# Patient Record
Sex: FEMALE | Race: WHITE | NOT HISPANIC OR LATINO | Employment: STUDENT | URBAN - METROPOLITAN AREA
[De-identification: names, ages, dates, MRNs, and addresses within clinical notes are randomized per-mention and may not be internally consistent; named-entity substitution may affect disease eponyms.]

---
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
## 2024-05-23 ENCOUNTER — PATIENT MESSAGE (OUTPATIENT)
Dept: NEUROLOGY | Facility: CLINIC | Age: 19
End: 2024-05-23
Payer: MEDICAID

## 2024-05-27 NOTE — PROGRESS NOTES
"[facility] Neurology Follow Up Office Visit Note    Initial Visit Date: 1/4/2023  Last Visit Date: 5/3/2023  Current Visit Date:  05/28/2024    Chief Complaint:     Chief Complaint   Patient presents with    Migraine     States 10-12 migraine/headaches per month; Nurtec every other day not effective; Not taking Rizatriptan; has been on Qulipta for a couple of days       History of Present Illness:      This is 19 y.o. female with history of anxiety, depression, who is referred for bruxism, chronic migraine without aura. During last visit, Rimegepant 75 ODT once every other day was started. Patient had been lost to follow up since 5/2023. Now complaining of left visual field permanent blurriness and distortion since 1/2024.      Age of Onset : 6 years old     Headache Description:   Bi-occipital, "annoying", lasting 4-5 days, severe, impeding day to day activity, with nausea, with photophobia and phonophobia. + left field vision loss.   2. Bitemporal, vortical, dull, moderate, not impeding day to day activity, without nausea, without photophobia and phonophobia. Would wake up with headache.      Frequency: 16-20 headache days per month 8-12 migraine headache days per month.      Provocation Factors: loud noises. Stress. Has started on Depo-Provera in 12/2023.      Risk Factors  - Family history of headache disorder: Yes maternal grandmother with headache disorder.  - History of focal CNS lesions: No  - History of CNS infections: No  - History head trauma: No  - History of underlying mood disorder: Yes anxiety, depression. Uncontrolled.  - History of sleep disorder: Yes Bruxism not on mouth guard.   - Recreational drug use: No  - Tobacco use: No  - Alcohol use: No  - Weight fluctuation: Not Applicable  - Isotretinoin or Tetracycline use:  No  - Family planning and contraceptive use: Yes on birth control. Switched over to Depo-Provera since 1/2024.    Medications:     Current Prophylactic  Atogepant 30 mg daily (5/23/2024 to " present): started by PCP     Current Abortive  Rizatriptan 10 mg twice a day as needed (1/4/2023 to present): no taking     Prior Prophylactic  Topiramate 150 mg once a day (8/1/2019 - 4/1/2022):  Ineffective  Effexor 37.5 mg daily (1/4/2023 to 2/1/2023): states severe depression.   Tizanidine 2 mg at bedtime (1/4/2023 to 2/1/2023)  Galcanezumab 120 mg once per month  (11/2022): only had one dose.   Sertraline 100 mg daily   Rimegepant 75 mg once every other day (5/3/2023 to 7/2023): no longer taking.      Prior Abortive  Sumatriptan 25 mg twice a day as needed (2/7/2022 - 3/25/2022):  Ineffective  Fioricet PRN (2019)        Devices:     - VNS:  - TNS  - TMS:     Procedures:     - Botox:  - PSG block:   - Occipital nerve block:     Labs:     No results found for this or any previous visit.    Studies:     - MRI Brain +/- Xavi [facility] 4/[facility]24 [facility]: As per report, unremarkable.   - MRI Brain without contrast 9/13/2021:  I have reviewed the study independently and with the patient. Unremarkable.  - MRA Head w/o Xavi:   - MRV Head w/o Xavi:   - NCHCT:  - Lumbar Puncture:    Review of Systems:     Review of Systems   All other systems reviewed and are negative.      Physical Exams:     Vitals:    05/28/24 0939   BP: 109/76   Pulse: 107   Resp: 14   Temp: 98.4 °F (36.9 °C)         Physical Exam  Vitals and nursing note reviewed.   Constitutional:       Appearance: Normal appearance.   HENT:      Head: Normocephalic and atraumatic.      Nose: Nose normal.      Mouth/Throat:      Mouth: Mucous membranes are moist.      Pharynx: Oropharynx is clear.   Eyes:      Conjunctiva/sclera: Conjunctivae normal.   Cardiovascular:      Rate and Rhythm: Normal rate and regular rhythm.      Pulses: Normal pulses.   Pulmonary:      Effort: Pulmonary effort is normal.      Breath sounds: Normal breath sounds.   Abdominal:      General: Abdomen is flat.   Musculoskeletal:         General: Normal range of motion.      Cervical back:  Normal range of motion.   Skin:     General: Skin is warm.   Neurological:      Mental Status: She is alert.         Comprehensive Neurological Exam:  Mental Status: Alert Oriented to Self, Date, and Place. Comprehension wnl. No dysarthria.   CN II - XII: HAMILTON, No APD, Fundus wnl OU, VF left homonymous hemianopsia, No ptosis OU, EOMI without nystagmus LT/Temp symmetric in CN V1-3 distribution, Hearing grossly intact, Face Symmetric, Tongue and Uvula midline, Trapezius symmetric bilateral.   Motor: tone and bulk wnl throughout, no abnormal involuntary or voluntary movements, 5/5 confrontation, Fine finger movements wnl b/l, No pronator drift.   Sensory: LT, Proprioception, Vibration, PP, Temp symmetric.  Reflexes: 2+ throughout, plantar reflexes downward bilateral.   Cerebellar: FNF wnl b/l, RAHM wnl b/l  Romberg: Negative  Gait: normal. Heel Gait, Toe Gait, Tandem Gait wnl.     Assessment:     This is 19 y.o. female with history of  anxiety, depression, who is referred for bruxism and chronic migraine with occipital aura now with visual snow, worsening by hormone based OCP. Medication overuse headache has resolved. Patient has failed multiple prophylactic medications.     Problem List Items Addressed This Visit          Neuro    Chronic migraine with aura without status migrainosus, not intractable - Primary    Relevant Medications    DEPO-PROVERA 150 mg/mL injection    atogepant 60 mg Tab    lamoTRIgine (LAMICTAL) 25 MG tablet    lamoTRIgine 50 mg TbDL (Start on 6/25/2024)    cyclobenzaprine (FLEXERIL) 5 MG tablet    DULoxetine (CYMBALTA) 30 MG capsule (Start on 6/27/2024)    rizatriptan (MAXALT) 10 MG tablet    Other Relevant Orders    Ambulatory referral/consult to Gynecology       Psychiatric    Anxiety (Chronic)    Relevant Medications    rizatriptan (MAXALT) 10 MG tablet       ENT    Bruxism (Chronic)    Relevant Medications    rizatriptan (MAXALT) 10 MG tablet     Other Visit Diagnoses       Visual snow  syndrome        Relevant Medications    DEPO-PROVERA 150 mg/mL injection    atogepant 60 mg Tab    lamoTRIgine (LAMICTAL) 25 MG tablet    lamoTRIgine 50 mg TbDL (Start on 6/25/2024)    cyclobenzaprine (FLEXERIL) 5 MG tablet    DULoxetine (CYMBALTA) 30 MG capsule (Start on 6/27/2024)    rizatriptan (MAXALT) 10 MG tablet    Other Relevant Orders    Ambulatory referral/consult to Gynecology              Plan:     [] increase Atogepant to 60 mg daily   [] start Lamotrigine 25 mg daily for 14 days then 50 mg daily thereafter  [] continue with Rizatriptan 10 mg twice a day as needed  [] decrease Duloxetine 60 mg daily for 30 days then 30 mg daily for 60 days   [] will need to switch hormone contraceptives to the non hormone contraceptives   [] referral to GYN for OCP management    RTC 2 months via Telemedicine    Visit today is associated with current or anticipated ongoing medical care related to this patient's single serious condition/complex condition as documented above.     Headache education provided: good sleep hygiene and 7 hours of sleep per night, stress management, medication overuse education provided. Using more 3 OTC per week may worsen headaches, high intensity interval training has shown to reduce headache frequency. Low carb, high protein has shown to reduce headache frequency. Patient is instructed in keep headache diary.     I have explained the treatment plan, diagnosis, and prognosis to patient. All questions are answered to the best of my knowledge.     Face to face time 95 minutes, including documentation, chart review, counseling, education, review of test results, relevant medical records, and coordination of care.       Laura Cruz MD   General Neurology  05/28/2024

## 2024-05-28 ENCOUNTER — OFFICE VISIT (OUTPATIENT)
Dept: NEUROLOGY | Facility: CLINIC | Age: 19
End: 2024-05-28
Payer: MEDICAID

## 2024-05-28 VITALS
TEMPERATURE: 98 F | HEART RATE: 107 BPM | RESPIRATION RATE: 14 BRPM | SYSTOLIC BLOOD PRESSURE: 109 MMHG | WEIGHT: 145.06 LBS | HEIGHT: 62 IN | OXYGEN SATURATION: 100 % | DIASTOLIC BLOOD PRESSURE: 76 MMHG | BODY MASS INDEX: 26.69 KG/M2

## 2024-05-28 DIAGNOSIS — F41.9 ANXIETY: ICD-10-CM

## 2024-05-28 DIAGNOSIS — F45.8 BRUXISM: ICD-10-CM

## 2024-05-28 DIAGNOSIS — G43.E09 CHRONIC MIGRAINE WITH AURA WITHOUT STATUS MIGRAINOSUS, NOT INTRACTABLE: Primary | ICD-10-CM

## 2024-05-28 DIAGNOSIS — H53.19 VISUAL SNOW SYNDROME: ICD-10-CM

## 2024-05-28 PROBLEM — G43.009 MIGRAINE WITHOUT AURA AND WITHOUT STATUS MIGRAINOSUS, NOT INTRACTABLE: Status: RESOLVED | Noted: 2023-01-04 | Resolved: 2024-05-28

## 2024-05-28 PROCEDURE — 3074F SYST BP LT 130 MM HG: CPT | Mod: CPTII,,, | Performed by: PSYCHIATRY & NEUROLOGY

## 2024-05-28 PROCEDURE — 1159F MED LIST DOCD IN RCRD: CPT | Mod: CPTII,,, | Performed by: PSYCHIATRY & NEUROLOGY

## 2024-05-28 PROCEDURE — 99417 PROLNG OP E/M EACH 15 MIN: CPT | Mod: S$PBB,,, | Performed by: PSYCHIATRY & NEUROLOGY

## 2024-05-28 PROCEDURE — 99213 OFFICE O/P EST LOW 20 MIN: CPT | Mod: PBBFAC | Performed by: PSYCHIATRY & NEUROLOGY

## 2024-05-28 PROCEDURE — G2211 COMPLEX E/M VISIT ADD ON: HCPCS | Mod: S$PBB,,, | Performed by: PSYCHIATRY & NEUROLOGY

## 2024-05-28 PROCEDURE — 3078F DIAST BP <80 MM HG: CPT | Mod: CPTII,,, | Performed by: PSYCHIATRY & NEUROLOGY

## 2024-05-28 PROCEDURE — 1160F RVW MEDS BY RX/DR IN RCRD: CPT | Mod: CPTII,,, | Performed by: PSYCHIATRY & NEUROLOGY

## 2024-05-28 PROCEDURE — 3008F BODY MASS INDEX DOCD: CPT | Mod: CPTII,,, | Performed by: PSYCHIATRY & NEUROLOGY

## 2024-05-28 PROCEDURE — 99215 OFFICE O/P EST HI 40 MIN: CPT | Mod: S$PBB,,, | Performed by: PSYCHIATRY & NEUROLOGY

## 2024-05-28 RX ORDER — RIZATRIPTAN BENZOATE 10 MG/1
10 TABLET ORAL 2 TIMES DAILY PRN
Qty: 9 TABLET | Refills: 4 | Status: SHIPPED | OUTPATIENT
Start: 2024-05-28 | End: 2024-06-27

## 2024-05-28 RX ORDER — DULOXETINE HYDROCHLORIDE 60 MG/1
60 CAPSULE, DELAYED RELEASE ORAL DAILY
COMMUNITY
Start: 2024-04-25 | End: 2024-05-28

## 2024-05-28 RX ORDER — LAMOTRIGINE 50 MG/1
50 TABLET, ORALLY DISINTEGRATING ORAL DAILY
Qty: 30 TABLET | Refills: 4 | Status: SHIPPED | OUTPATIENT
Start: 2024-06-25 | End: 2024-10-23

## 2024-05-28 RX ORDER — CYCLOBENZAPRINE HCL 5 MG
5 TABLET ORAL NIGHTLY
COMMUNITY
Start: 2024-05-23 | End: 2024-05-28 | Stop reason: SDUPTHER

## 2024-05-28 RX ORDER — DULOXETIN HYDROCHLORIDE 30 MG/1
30 CAPSULE, DELAYED RELEASE ORAL DAILY
Qty: 30 CAPSULE | Refills: 1 | Status: SHIPPED | OUTPATIENT
Start: 2024-06-27 | End: 2024-08-26

## 2024-05-28 RX ORDER — CYCLOBENZAPRINE HCL 5 MG
5 TABLET ORAL 2 TIMES DAILY PRN
Qty: 60 TABLET | Refills: 4 | Status: SHIPPED | OUTPATIENT
Start: 2024-05-28 | End: 2024-06-27

## 2024-05-28 RX ORDER — ATOGEPANT 30 MG/1
30 TABLET ORAL DAILY
COMMUNITY
Start: 2024-05-23 | End: 2024-05-28

## 2024-05-28 RX ORDER — MEDROXYPROGESTERONE ACETATE 150 MG/ML
150 INJECTION, SUSPENSION INTRAMUSCULAR
COMMUNITY
Start: 2024-04-24

## 2024-05-28 RX ORDER — LAMOTRIGINE 25 MG/1
TABLET ORAL
Qty: 42 TABLET | Refills: 0 | Status: SHIPPED | OUTPATIENT
Start: 2024-05-28 | End: 2024-06-25

## 2024-06-02 ENCOUNTER — PATIENT MESSAGE (OUTPATIENT)
Dept: NEUROLOGY | Facility: CLINIC | Age: 19
End: 2024-06-02
Payer: MEDICAID

## 2024-06-27 NOTE — PROGRESS NOTES
"[facility] Neurology Follow Up Telemedicine Visit Note    Initial Visit Date: 1/4/2023  Last Visit Date: 5/28/2024  Current Visit Date:  07/01/2024    Chief Complaint:     Chief Complaint   Patient presents with    Migraine     Patient reports no migraines since last visit, however, c/o constant headache.       History of Present Illness:      This is a real-time audio/video visit that was performed with the originating site at patient's home and the distant site, [facility]. Verbal consent to participate in interactive audio & video visit was obtained.    I discussed with the patient regarding the nature of our telehealth visits, that:    - Our sessions are not being recorded and that personal health information is protected  - Provider would evaluate the patient and recommend diagnostics and treatments based on my assessment  - [facility] will provide follow up care in person if/when the patient needs it.     This is 19 y.o. female with history of anxiety, depression, who is referred for bruxism and chronic migraine with occipital aura now with visual snow, worsening by hormone based OCP. Medication overuse headache has resolved. Patient has failed multiple prophylactic medications. During last visit, Atogepant was increased to 60 mg daily, lamotrigine 50 mg daily was started, duloxetine was decreased to 30 mg daily. 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 Still having palpitations and tachycardia. Still having visual snow. Now reporting episodes of left sided weakness.      Age of Onset : 6 years old     Headache Description:   Bi-occipital, "annoying", lasting 4-5 days, severe, impeding day to day activity, with nausea, with photophobia and phonophobia. + left field vision loss.   2. Bitemporal, vortical, dull, moderate, not impeding day to day activity, without nausea, without photophobia and phonophobia. Would wake up with headache.      Frequency: " 30 headache days per month 0 migraine headache days per month.      Provocation Factors: loud noises. Stress. Has started on Depo-Provera in 12/2023.      Risk Factors  - Family history of headache disorder: Yes maternal grandmother with headache disorder.  - History of focal CNS lesions: No  - History of CNS infections: No  - History head trauma: No  - History of underlying mood disorder: Yes anxiety, depression. Better.   - History of sleep disorder: Yes Bruxism not on mouth guard.   - Recreational drug use: No  - Tobacco use: No  - Alcohol use: No  - Weight fluctuation: Not Applicable  - Isotretinoin or Tetracycline use:  No  - Family planning and contraceptive use: Yes on birth control. Switched over to Depo-Provera since 1/2024.    Medications:     Current Prophylactic  Atogepant 60 mg daily (5/28/2024 to present): started by PCP  Lamotrigine 50 mg daily (6/14/2024 to present)    Current Abortive  Rizatriptan 10 mg twice a day as needed (1/4/2023 to present): ineffective.      Prior Prophylactic  Topiramate 150 mg once a day (8/1/2019 - 4/1/2022):  Ineffective  Effexor 37.5 mg daily (1/4/2023 to 2/1/2023): states severe depression.   Tizanidine 2 mg at bedtime (1/4/2023 to 2/1/2023)  Galcanezumab 120 mg once per month  (11/2022): only had one dose.   Sertraline 100 mg daily   Rimegepant 75 mg once every other day (5/3/2023 to 7/2023): no longer taking.      Prior Abortive  Sumatriptan 25 mg twice a day as needed (2/7/2022 - 3/25/2022):  Ineffective  Fioricet PRN (2019)        Devices:     - VNS:  - TNS  - TMS:     Procedures:     - Botox:  - PSG block:   - Occipital nerve block:     Labs:     No results found for this or any previous visit.    Studies:     - MRI Brain +/- Xavi at [facility] 6/12/2024: as per report, unremarkable.   - [facility] Brain +/- [facility] 4[facility]2024 [facility]: As per report, unremarkable.   - MRI Brain without contrast 9/13/2021:  I have reviewed the study independently and with the patient.  Unremarkable.  - 24 hr EMU 6/12/2024: non epileptic events.      Review of Systems:     Review of Systems   All other systems reviewed and are negative.      Physical Exams:     Physical Exam  Nursing note reviewed.   Constitutional:       Appearance: Normal appearance.   HENT:      Head: Normocephalic and atraumatic.   Pulmonary:      Effort: Pulmonary effort is normal.   Musculoskeletal:         General: Normal range of motion.      Cervical back: Normal range of motion.   Skin:     General: Skin is warm.   Neurological:      Mental Status: She is alert.       Comprehensive Neurological Exam:  Mental Status: Alert Oriented to Self, Date, and Place. Naming, repetition, reading, and writing wnl. Comprehension wnl. No dysarthria.   CN II - XII: HAMILTON, No APD, VFFC, No ptosis OU, EOMI without nystagmus, LT/Temp symmetric in CN V1-3 distribution, Hearing grossly intact, Face Symmetric, Tongue and Uvula midline, Trapezius symmetric bilateral.   Motor: tone and bulk wnl throughout, no abnormal involuntary or voluntary movements, 5/5 to confrontation, Fine finger movements wnl b/l, No pronator drift.   Sensory: LT, Proprioception, Vibration, PP, Temp symmetric.  Reflexes: 2+ throughout, plantar reflexes downward bilateral.   Cerebellar: FNF wnl b/l, RAHM wnl b/l  Romberg: Negative  Gait: normal.       Assessment:     This is 19 y.o. female with history of  anxiety, depression, who is referred for bruxism, tension headache, migraine with occipital aura now with visual snow, and PNEE.     Problem List Items Addressed This Visit          Psychiatric    Anxiety (Chronic)     Other Visit Diagnoses       Visual snow syndrome    -  Primary    Migraine without aura and without status migrainosus, not intractable                  Plan:     [] continue with Atogepant 60 mg daily   [] increase Lamotrigine to 100 mg daily   [] stop Rizatriptan 10 mg twice a day as needed  [] stop Cyclobenzaprine   [] start Tizanidine 2 mg three times  a day as needed   [] start Ubrogepant 100 mg twice a day as needed: patient had failed rizatriptan and sumatriptan  [] will need to switch hormone contraceptives to the non hormone contraceptives   [] referral to psychiatry for anxiety and PNEE  [] may go back to work starting next week    RTC 2 months via Telemedicine    Visit today is associated with current or anticipated ongoing medical care related to this patient's single serious condition/complex condition as documented above.     Headache education provided: good sleep hygiene and 7 hours of sleep per night, stress management, medication overuse education provided. Using more 3 OTC per week may worsen headaches, high intensity interval training has shown to reduce headache frequency. Low carb, high protein has shown to reduce headache frequency. Patient is instructed in keep headache diary.     I have explained the treatment plan, diagnosis, and prognosis to patient. All questions are answered to the best of my knowledge.     Face to face time 40 minutes, including documentation, chart review, counseling, education, review of test results, relevant medical records, and coordination of care.       Laura Cruz MD   General Neurology  07/01/2024

## 2024-07-01 ENCOUNTER — OFFICE VISIT (OUTPATIENT)
Dept: NEUROLOGY | Facility: CLINIC | Age: 19
End: 2024-07-01
Payer: MEDICAID

## 2024-07-01 DIAGNOSIS — F41.9 ANXIETY: ICD-10-CM

## 2024-07-01 DIAGNOSIS — G44.229 CHRONIC TENSION-TYPE HEADACHE, NOT INTRACTABLE: ICD-10-CM

## 2024-07-01 DIAGNOSIS — F44.5 FUNCTIONAL NEUROLOGICAL SYMPTOM DISORDER WITH ATTACKS OR SEIZURES: ICD-10-CM

## 2024-07-01 DIAGNOSIS — F45.8 BRUXISM: ICD-10-CM

## 2024-07-01 DIAGNOSIS — H53.19 VISUAL SNOW SYNDROME: ICD-10-CM

## 2024-07-01 DIAGNOSIS — G43.E09 CHRONIC MIGRAINE WITH AURA WITHOUT STATUS MIGRAINOSUS, NOT INTRACTABLE: ICD-10-CM

## 2024-07-01 DIAGNOSIS — R00.0 TACHYCARDIA: ICD-10-CM

## 2024-07-01 DIAGNOSIS — G43.111 INTRACTABLE MIGRAINE WITH AURA WITH STATUS MIGRAINOSUS: Primary | ICD-10-CM

## 2024-07-01 PROCEDURE — G2211 COMPLEX E/M VISIT ADD ON: HCPCS | Mod: 95,,, | Performed by: PSYCHIATRY & NEUROLOGY

## 2024-07-01 PROCEDURE — 1159F MED LIST DOCD IN RCRD: CPT | Mod: CPTII,95,, | Performed by: PSYCHIATRY & NEUROLOGY

## 2024-07-01 PROCEDURE — 99215 OFFICE O/P EST HI 40 MIN: CPT | Mod: 95,,, | Performed by: PSYCHIATRY & NEUROLOGY

## 2024-07-01 RX ORDER — TIZANIDINE 2 MG/1
2 TABLET ORAL 3 TIMES DAILY PRN
Qty: 60 TABLET | Refills: 4 | Status: SHIPPED | OUTPATIENT
Start: 2024-07-01 | End: 2024-07-31

## 2024-07-01 RX ORDER — ATOGEPANT 60 MG/1
1 TABLET ORAL DAILY
COMMUNITY
End: 2024-07-01 | Stop reason: SDUPTHER

## 2024-07-01 RX ORDER — LAMOTRIGINE 100 MG/1
100 TABLET, EXTENDED RELEASE ORAL DAILY
Qty: 30 TABLET | Refills: 3 | Status: SHIPPED | OUTPATIENT
Start: 2024-07-01 | End: 2024-10-29

## 2024-07-01 RX ORDER — LAMOTRIGINE 50 MG/1
1 TABLET, EXTENDED RELEASE ORAL DAILY
COMMUNITY
End: 2024-07-01

## 2024-07-01 RX ORDER — CYCLOBENZAPRINE HCL 5 MG
1 TABLET ORAL EVERY MORNING
COMMUNITY
End: 2024-07-01

## 2024-07-01 RX ORDER — ATOGEPANT 60 MG/1
1 TABLET ORAL DAILY
Qty: 30 TABLET | Refills: 4 | Status: SHIPPED | OUTPATIENT
Start: 2024-07-01 | End: 2024-12-28

## 2024-07-01 NOTE — LETTER
July 1, 2024      [facility]  [address] [facility] [address]  Phone: [phone]       Patient: Sheila John   YOB: 2005  Date of Visit: 07/01/2024    To Whom It May Concern:    Kyara John  was at [facility] on 07/01/2024. The patient may return to work/school on August 1, 2024 with no restrictions. If you have any questions or concerns, or if I can be of further assistance, please do not hesitate to contact me.    Sincerely,    Radhika Myers MA

## 2024-07-08 ENCOUNTER — PATIENT MESSAGE (OUTPATIENT)
Dept: NEUROLOGY | Facility: CLINIC | Age: 19
End: 2024-07-08
Payer: MEDICAID

## 2024-07-17 ENCOUNTER — PATIENT MESSAGE (OUTPATIENT)
Dept: NEUROLOGY | Facility: CLINIC | Age: 19
End: 2024-07-17
Payer: MEDICAID

## 2024-08-14 ENCOUNTER — OFFICE VISIT (OUTPATIENT)
Dept: BEHAVIORAL HEALTH | Facility: CLINIC | Age: 19
End: 2024-08-14
Payer: MEDICAID

## 2024-08-14 ENCOUNTER — LAB VISIT (OUTPATIENT)
Dept: LAB | Facility: HOSPITAL | Age: 19
End: 2024-08-14
Attending: INTERNAL MEDICINE
Payer: MEDICAID

## 2024-08-14 VITALS
DIASTOLIC BLOOD PRESSURE: 67 MMHG | WEIGHT: 149.88 LBS | HEART RATE: 98 BPM | OXYGEN SATURATION: 100 % | TEMPERATURE: 98 F | SYSTOLIC BLOOD PRESSURE: 110 MMHG | BODY MASS INDEX: 27.42 KG/M2

## 2024-08-14 DIAGNOSIS — F33.0 MILD EPISODE OF RECURRENT MAJOR DEPRESSIVE DISORDER: ICD-10-CM

## 2024-08-14 DIAGNOSIS — F41.9 ANXIETY DISORDER OF CHILDHOOD OR ADOLESCENCE: ICD-10-CM

## 2024-08-14 DIAGNOSIS — R00.0 TACHYCARDIA, UNSPECIFIED: Primary | ICD-10-CM

## 2024-08-14 DIAGNOSIS — R56.9 GENERALIZED-ONSET SEIZURES: ICD-10-CM

## 2024-08-14 DIAGNOSIS — F41.1 GAD (GENERALIZED ANXIETY DISORDER): ICD-10-CM

## 2024-08-14 DIAGNOSIS — F51.5 NIGHTMARE DISORDER: Primary | ICD-10-CM

## 2024-08-14 DIAGNOSIS — R51.9 FACIAL PAIN: ICD-10-CM

## 2024-08-14 LAB — TSH SERPL-ACNC: 1.98 UIU/ML (ref 0.35–4.94)

## 2024-08-14 PROCEDURE — 36415 COLL VENOUS BLD VENIPUNCTURE: CPT

## 2024-08-14 PROCEDURE — 1159F MED LIST DOCD IN RCRD: CPT | Mod: CPTII,,, | Performed by: STUDENT IN AN ORGANIZED HEALTH CARE EDUCATION/TRAINING PROGRAM

## 2024-08-14 PROCEDURE — 1160F RVW MEDS BY RX/DR IN RCRD: CPT | Mod: CPTII,,, | Performed by: STUDENT IN AN ORGANIZED HEALTH CARE EDUCATION/TRAINING PROGRAM

## 2024-08-14 PROCEDURE — 99213 OFFICE O/P EST LOW 20 MIN: CPT | Mod: PBBFAC,PN | Performed by: STUDENT IN AN ORGANIZED HEALTH CARE EDUCATION/TRAINING PROGRAM

## 2024-08-14 PROCEDURE — 99205 OFFICE O/P NEW HI 60 MIN: CPT | Mod: AF,HA,S$PBB, | Performed by: STUDENT IN AN ORGANIZED HEALTH CARE EDUCATION/TRAINING PROGRAM

## 2024-08-14 PROCEDURE — 3074F SYST BP LT 130 MM HG: CPT | Mod: CPTII,,, | Performed by: STUDENT IN AN ORGANIZED HEALTH CARE EDUCATION/TRAINING PROGRAM

## 2024-08-14 PROCEDURE — 3008F BODY MASS INDEX DOCD: CPT | Mod: CPTII,,, | Performed by: STUDENT IN AN ORGANIZED HEALTH CARE EDUCATION/TRAINING PROGRAM

## 2024-08-14 PROCEDURE — 3078F DIAST BP <80 MM HG: CPT | Mod: CPTII,,, | Performed by: STUDENT IN AN ORGANIZED HEALTH CARE EDUCATION/TRAINING PROGRAM

## 2024-08-14 PROCEDURE — 84443 ASSAY THYROID STIM HORMONE: CPT

## 2024-08-14 RX ORDER — METOPROLOL SUCCINATE 25 MG/1
25 TABLET, EXTENDED RELEASE ORAL
COMMUNITY
Start: 2024-08-13

## 2024-08-14 RX ORDER — VILAZODONE HYDROCHLORIDE 10 MG/1
10 TABLET ORAL DAILY
Qty: 30 TABLET | Refills: 11 | Status: SHIPPED | OUTPATIENT
Start: 2024-08-14 | End: 2025-08-14

## 2024-08-14 RX ORDER — PRAZOSIN HYDROCHLORIDE 1 MG/1
1 CAPSULE ORAL NIGHTLY
Qty: 30 CAPSULE | Refills: 5 | Status: SHIPPED | OUTPATIENT
Start: 2024-08-14 | End: 2025-08-14
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
## 2024-08-19 ENCOUNTER — NURSE TRIAGE (OUTPATIENT)
Dept: ADMINISTRATIVE | Facility: CLINIC | Age: 19
End: 2024-08-19
Payer: MEDICAID

## 2024-08-19 NOTE — TELEPHONE ENCOUNTER
Pt reports having lumbar puncture last Thursday. Reports she is having bad HA, neck pain, back pain, reports numbness to left arm. States she was told to take tylenol, and aspirin, but states not helping advised to be seen in ED/UC. Verbalized understanding.    Reason for Disposition   SEVERE headache and after spinal (epidural) anesthesia    Additional Information   Negative: Sounds like a life-threatening emergency to the triager   Negative: Bright red, wide-spread, sunburn-like rash    Protocols used: Post-Op Symptoms and [id number]

## 2024-10-03 ENCOUNTER — OFFICE VISIT (OUTPATIENT)
Dept: BEHAVIORAL HEALTH | Facility: CLINIC | Age: 19
End: 2024-10-03
Payer: MEDICAID

## 2024-10-03 DIAGNOSIS — F33.0 MILD EPISODE OF RECURRENT MAJOR DEPRESSIVE DISORDER: Primary | ICD-10-CM

## 2024-10-03 DIAGNOSIS — F41.1 GAD (GENERALIZED ANXIETY DISORDER): ICD-10-CM

## 2024-10-03 NOTE — PROGRESS NOTES
"Outpatient Psychiatry Follow-Up Visit    10/3/2024    Clinical Status of Patient:  Outpatient (Ambulatory)    Chief Complaint:  Sheila John is a 19 y.o. female who presents today for follow-up of depression and anxiety. Patient last seen for initial evaluation on 8/14/2024. Met with patient.      TELE PSYCHIATRY Disclaimer   *The patient was informed despite using HIPPA compliant technology there may be risks including security breach, technological failure, inability to perform a comprehensive physical exam which could delay or prevent an accurate diagnosis, and potential complications from treatment decisions rendered over a telemedical platform.   The patient was also informed of the relationship between the physician and patient and the respective role of any other health care provider with respect to management of the patient; and notified that the pt may decline to receive medical services by telemedicine and may withdraw from such care at any time.     Patient's Current location: [facility] (in personal vehicle)    In Case of Emergency pts next of kin  Name:Greta Hull  Phone number:  [phone]   Visit type: Virtual visit with synchronous audio and video  Total time spent with patient: 30 minutes    Interval History and Content of Current Session:  Interim Events/Subjective Report/Content of Current Session:   Pt reports doing "a lot different"  overall.  Notes that she has a new job as a  at the [facility] [employer] (likes this new work).  Recently saw neurologist for return of headaches/migraines, prescribed elavil for headaches.  Had two interim seizure-like episodes.  Never started on vilazodone due to insurance issue.  Was told not to take prazosin by her cardiologist.  Reports "weird" mood, denies currently feeling depressed, increased  anxiety (especially when talking with new co-workers).   Sleeping poorly due to dreams, continues to report nightmares.  Appetite decreased, weight " decreased (unintentional).  Energy lower, motivation fair.  Endorses irritability, denies hopelessness.  Denies SI/HI/AVH, denies plan or desire for self harm or harm to others.  Denies SE from current regimen Reports somatic complaints of headaches and occasional back pain. Pt unsure if she wants to make changes to medication regimen.     Psychiatric Review of Systems-is patient experiencing or having changes in  Integrated into HPI above.     Review of Systems   PSYCHIATRIC: Pertinant items are noted in the narrative.  CONSTITUTIONAL: No weight gain or loss.  MUSCULOSKELETAL: Denies myalgias, +back pain occasional.  NEUROLOGIC: No weakness, sensory changes, seizures, confusion, memory loss, tremor or other abnormal movements.  +headaches  CARDIAC: No CP, no palpitations  RESPIRATORY: No shortness of breath.  CARDIOVASCULAR: No tachycardia or chest pain.  GASTROINTESTINAL: No nausea, vomiting, pain, constipation or diarrhea.    Past Medical, Family and Social History: The patient's past medical, family and social history have been reviewed and updated as appropriate within the electronic medical record - see encounter notes.    Compliance: did not start viibryd or prazosin    Side effects: denies    Risk Parameters:  Patient reports no suicidal ideation  Patient reports no homicidal ideation  Patient reports no self-injurious behavior  Patient reports no violent behavior    Exam (detailed: at least 9 elements; comprehensive: all 15 elements)   Constitutional  Vitals:  Most recent vital signs, dated less than 90 days prior to this appointment, were reviewed.     There were no vitals filed for this visit.       General:   Constitutional: No acute distress, appears stated age, casually dressed    Neurologic:   Motor: moves all extremities spontaneously and without difficulty, no abnormal involuntary movements observed  Gait: NIDHI 2/2 virtual visit    Mental status examination:   Appearance: appears stated age, casually  "dressed, no acute distress  Behavior: unremarkable for situation, calm and cooperative  Mood: "ok"  Affect: mood congruent, constricted, and anxious-appearing  Thought process: linear and goal directed  Thought content: no plan or desire for self harm or harm to others, denies paranoia, no delusional ideation volunteered  Perceptions: denies hallucinations or other altered perceptions  Associations: appropriate for conversation  Orientation: oriented to day of week, month, year, location, and situation  Language: English, fluid  Attention: able to attend to interview  Insight: good  Judgement: good    PHQ9:  Over the last two weeks how often have you been bothered by little interest or pleasure in doing things: 0  Over the last two weeks how often have you been bothered by feeling down, depressed or hopeless: 1  PHQ-2 Total Score: 1  PHQ-9 Score: 7  PHQ-9 Interpretation: Mild          8/14/2024    10:01 AM   SABRINA-7   Was test performed? Yes   1. Feeling nervous, anxious, or on edge? Several days   2. Not being able to stop or control worrying? Several days   3. Worrying too much about different things? Several days   4. Trouble relaxing? Not at all   5. Being so restless that it is hard to sit still? Not at all   6. Becoming easily annoyed or irritable? More than half the days   7. Feeling afraid as if something awful might happen? Not at all   8. If you checked off any problems, how difficult have these problems made it for you to do your work, take care of things at home, or get along with other people? Somewhat difficult   SABRINA-7 Score 5   Number answered (out of first 7) 7   Interpretation Mild Anxiety       Assessment and Diagnosis   Status/Progress: Based on the examination today, the patient's problem(s) is/are adequately but not ideally controlled.  New problems have not been presented today.   Co-morbidities are complicating management of the primary condition.  Number of separate conditions addressed during " today's visit: 3 (mood fair control, anxiety fair control, PNES fair control) .  Medication management: Yes (continue previously ordered medication).  Are referral(s) being ordered today: No.  Complexity (level) of medical decision making employed in the encounter: MODERATE.    General Impression:    ICD-10-CM ICD-9-CM   1. Mild episode of recurrent major depressive disorder  F33.0 296.31   2. SABRINA (generalized anxiety disorder)  F41.1 300.02     Intervention/Counseling/Treatment Plan   Prazosin discontinued by cardiology due to potential for hypotension  Pt has not started vilazodone due to insurance issues, will investigate  Agree with trial of elavil 10mg nightly for headache prevention  Recommend pt establish with a psychotherapist/counselor, provided names of providers in the [place] area  No need for PEC as pt is not an imminent danger to self or others or gravely disabled due to acute psychiatric illness  Discussed that pt should either call clinic for psychiatric crisis symptoms or present to nearest emergency room    Discussed with patient informed consent including diagnosis, risks and benefits of proposed treatment above vs. alternative treatments vs. no treatment, as well as serious and common side effects of these treatments, and the inherent unpredictability of individual responses to these treatments. The patient expresses understanding of the above and displays the capacity to agree with this current plan. Patient also agrees that, currently, the benefits outweigh the risks and would like to pursue treatment at this time, and had no other questions.    Instructions:  Take all medications as prescribed.    Abstain from recreational drugs and alcohol.  Present to ED or call 911 for SI/HI plan or intent, psychosis, or medical emergency.    Return to Clinic: Follow up in about 2 months (around 12/3/2024).    Total time:   The total time for services performed on the date of the encounter (including  review of prior visit notes, review of notes from other providers, review of results from laboratory/imaging studies, face-to-face time with patient, and time spent on other activities directly related to patient care): 30 minutes.    Lucien Castaneda MD  [facility]

## 2024-10-30 DIAGNOSIS — G43.E09 CHRONIC MIGRAINE WITH AURA WITHOUT STATUS MIGRAINOSUS, NOT INTRACTABLE: ICD-10-CM

## 2024-10-30 DIAGNOSIS — G43.111 INTRACTABLE MIGRAINE WITH AURA WITH STATUS MIGRAINOSUS: ICD-10-CM

## 2024-10-30 DIAGNOSIS — H53.19 VISUAL SNOW SYNDROME: ICD-10-CM

## 2024-10-30 DIAGNOSIS — F41.9 ANXIETY: ICD-10-CM

## 2024-10-31 RX ORDER — ATOGEPANT 60 MG/1
1 TABLET ORAL
Qty: 30 TABLET | Refills: 4 | Status: SHIPPED | OUTPATIENT
Start: 2024-10-31

## 2024-10-31 RX ORDER — LAMOTRIGINE 100 MG/1
TABLET, EXTENDED RELEASE ORAL
Qty: 30 TABLET | Refills: 3 | Status: SHIPPED | OUTPATIENT
Start: 2024-10-31

## 2024-12-03 ENCOUNTER — OFFICE VISIT (OUTPATIENT)
Dept: BEHAVIORAL HEALTH | Facility: CLINIC | Age: 19
End: 2024-12-03
Payer: MEDICAID

## 2024-12-03 DIAGNOSIS — F41.1 GAD (GENERALIZED ANXIETY DISORDER): ICD-10-CM

## 2024-12-03 DIAGNOSIS — F33.0 MILD EPISODE OF RECURRENT MAJOR DEPRESSIVE DISORDER: Primary | ICD-10-CM

## 2024-12-03 PROCEDURE — 1159F MED LIST DOCD IN RCRD: CPT | Mod: CPTII,95,, | Performed by: STUDENT IN AN ORGANIZED HEALTH CARE EDUCATION/TRAINING PROGRAM

## 2024-12-03 PROCEDURE — 1160F RVW MEDS BY RX/DR IN RCRD: CPT | Mod: CPTII,95,, | Performed by: STUDENT IN AN ORGANIZED HEALTH CARE EDUCATION/TRAINING PROGRAM

## 2024-12-03 PROCEDURE — 99214 OFFICE O/P EST MOD 30 MIN: CPT | Mod: AH,HA,95, | Performed by: STUDENT IN AN ORGANIZED HEALTH CARE EDUCATION/TRAINING PROGRAM

## 2024-12-03 RX ORDER — VORTIOXETINE 5 MG/1
5 TABLET, FILM COATED ORAL DAILY
Qty: 30 TABLET | Refills: 5 | Status: SHIPPED | OUTPATIENT
Start: 2024-12-03

## 2024-12-03 NOTE — PROGRESS NOTES
"Outpatient Psychiatry Follow-Up Visit    12/3/2024    Clinical Status of Patient:  Outpatient (Ambulatory)    Chief Complaint:  Sheila John is a 19 y.o. female who presents today for follow-up of depression and anxiety. Patient last seen for follow-up on 10/3/2024. Met with patient.      TELE PSYCHIATRY Disclaimer   *The patient was informed despite using HIPPA compliant technology there may be risks including security breach, technological failure, inability to perform a comprehensive physical exam which could delay or prevent an accurate diagnosis, and potential complications from treatment decisions rendered over a telemedical platform.   The patient was also informed of the relationship between the physician and patient and the respective role of any other health care provider with respect to management of the patient; and notified that the pt may decline to receive medical services by telemedicine and may withdraw from such care at any time.     Patient's Current location: [facility] (in personal vehicle)    In Case of Emergency pts next of kin  Name:Greta Hull  Phone number:  [phone]   Visit type: Virtual visit with synchronous audio and video  Total time spent with patient: 24 minutes    Interval History and Content of Current Session:  Interim Events/Subjective Report/Content of Current Session:   Pt reports doing "ok"  overall.  Reports "good" mood, "pretty good" anxiety.  Sleep "ok," has been having nightmares. Appetite "up and down", weight stable.  Energy Ok, motivation good.  Increased irritability, denies hopelessness.  Denies SI/HI/AVH/paranoia, denies plan or desire for self harm or harm to others.  Denies SE from current regimen. Denies somatic complaints.   Pt voices desire to adjust regimen to address ongoing symptoms.      Psychiatric Review of Systems-is patient experiencing or having changes in  Integrated into HPI above.     Review of Systems   PSYCHIATRIC: Pertinant items " "are noted in the narrative.  CONSTITUTIONAL: No weight gain or loss.  MUSCULOSKELETAL: Denies myalgias, +back pain occasional.  NEUROLOGIC: No weakness, sensory changes, seizures, confusion, memory loss, tremor or other abnormal movements.  +headaches  CARDIAC: No CP, no palpitations  RESPIRATORY: No shortness of breath.  CARDIOVASCULAR: No tachycardia or chest pain.  GASTROINTESTINAL: No nausea, vomiting, pain, constipation or diarrhea.    Past Medical, Family and Social History: The patient's past medical, family and social history have been reviewed and updated as appropriate within the electronic medical record - see encounter notes.    Compliance: did not start viibryd or prazosin    Side effects: denies    Risk Parameters:  Patient reports no suicidal ideation  Patient reports no homicidal ideation  Patient reports no self-injurious behavior  Patient reports no violent behavior    Exam (detailed: at least 9 elements; comprehensive: all 15 elements)   Constitutional  Vitals:  Most recent vital signs, dated less than 90 days prior to this appointment, were reviewed.     There were no vitals filed for this visit.       General:   Constitutional: No acute distress, appears stated age, casually dressed    Neurologic:   Motor: moves all extremities spontaneously and without difficulty, no abnormal involuntary movements observed  Gait: [facility] 2/2 virtual visit    Mental status examination:   Appearance: appears stated age, casually dressed, no acute distress  Behavior: unremarkable for situation, calm and cooperative  Mood: "ok"  Affect: mood congruent, constricted, and anxious-appearing  Thought process: linear and goal directed  Thought content: no plan or desire for self harm or harm to others, denies paranoia, no delusional ideation volunteered  Perceptions: denies hallucinations or other altered perceptions  Associations: appropriate for conversation  Orientation: oriented to day of week, month, year, location, and " situation  Language: English, fluid  Attention: able to attend to interview  Insight: good  Judgement: good    PHQ9:  Over the last two weeks how often have you been bothered by little interest or pleasure in doing things: 0  Over the last two weeks how often have you been bothered by feeling down, depressed or hopeless: 1  PHQ-2 Total Score: 1  PHQ-9 Score: 7  PHQ-9 Interpretation: Mild          8/14/2024    10:01 AM   SABRINA-7   Was test performed? Yes   1. Feeling nervous, anxious, or on edge? Several days   2. Not being able to stop or control worrying? Several days   3. Worrying too much about different things? Several days   4. Trouble relaxing? Not at all   5. Being so restless that it is hard to sit still? Not at all   6. Becoming easily annoyed or irritable? More than half the days   7. Feeling afraid as if something awful might happen? Not at all   8. If you checked off any problems, how difficult have these problems made it for you to do your work, take care of things at home, or get along with other people? Somewhat difficult   SABRINA-7 Score 5   Number answered (out of first 7) 7   Interpretation Mild Anxiety     Assessment and Diagnosis   Status/Progress: Based on the examination today, the patient's problem(s) is/are adequately but not ideally controlled.  New problems have not been presented today.   Co-morbidities are complicating management of the primary condition.  Number of separate conditions addressed during today's visit: 3 (mood fair control, anxiety fair control, PNES fair control) .  Medication management: Yes (continue previously ordered medication).  Are referral(s) being ordered today: No.  Complexity (level) of medical decision making employed in the encounter: MODERATE.    General Impression:    ICD-10-CM ICD-9-CM   1. Mild episode of recurrent major depressive disorder  F33.0 296.31   2. SABRINA (generalized anxiety disorder)  F41.1 300.02     Intervention/Counseling/Treatment Plan    Encouraged pt to continue working with psychotherapist at [facility]  Start trintellix 5mg daily for mood/anxiety symptoms, Discussed potential SE including but not limited to GI upset, headache, HTN, tachycardia, suicidal thinking  Will give samples x2 weeks for trintellix, anticipate need for PA  Recommend pt establish with a psychotherapist/counselor, provided names of providers in the [place] area  No need for PEC as pt is not an imminent danger to self or others or gravely disabled due to acute psychiatric illness  Discussed that pt should either call clinic for psychiatric crisis symptoms or present to nearest emergency room    Discussed with patient informed consent including diagnosis, risks and benefits of proposed treatment above vs. alternative treatments vs. no treatment, as well as serious and common side effects of these treatments, and the inherent unpredictability of individual responses to these treatments. The patient expresses understanding of the above and displays the capacity to agree with this current plan. Patient also agrees that, currently, the benefits outweigh the risks and would like to pursue treatment at this time, and had no other questions.    Instructions:  Take all medications as prescribed.    Abstain from recreational drugs and alcohol.  Present to ED or call 911 for SI/HI plan or intent, psychosis, or medical emergency.    Return to Clinic: Follow up in about 3 months (around 3/3/2025).    Total time:   The total time for services performed on the date of the encounter (including review of prior visit notes, review of notes from other providers, review of results from laboratory/imaging studies, face-to-face time with patient, and time spent on other activities directly related to patient care): 24 minutes.    Lucien Castaneda MD  [facility]

## 2024-12-07 DIAGNOSIS — G43.111 INTRACTABLE MIGRAINE WITH AURA WITH STATUS MIGRAINOSUS: ICD-10-CM

## 2024-12-07 DIAGNOSIS — H53.19 VISUAL SNOW SYNDROME: ICD-10-CM

## 2024-12-07 DIAGNOSIS — F41.9 ANXIETY: ICD-10-CM

## 2024-12-07 DIAGNOSIS — G43.E09 CHRONIC MIGRAINE WITH AURA WITHOUT STATUS MIGRAINOSUS, NOT INTRACTABLE: ICD-10-CM

## 2024-12-09 RX ORDER — ATOGEPANT 60 MG/1
1 TABLET ORAL
Qty: 30 TABLET | Refills: 4 | Status: SHIPPED | OUTPATIENT
Start: 2024-12-09

## 2025-03-06 ENCOUNTER — OFFICE VISIT (OUTPATIENT)
Dept: BEHAVIORAL HEALTH | Facility: CLINIC | Age: 20
End: 2025-03-06
Payer: COMMERCIAL

## 2025-03-06 ENCOUNTER — PATIENT MESSAGE (OUTPATIENT)
Dept: BEHAVIORAL HEALTH | Facility: CLINIC | Age: 20
End: 2025-03-06
Payer: MEDICAID

## 2025-03-06 VITALS
TEMPERATURE: 99 F | OXYGEN SATURATION: 100 % | RESPIRATION RATE: 18 BRPM | SYSTOLIC BLOOD PRESSURE: 111 MMHG | HEART RATE: 99 BPM | HEIGHT: 62 IN | BODY MASS INDEX: 28.39 KG/M2 | DIASTOLIC BLOOD PRESSURE: 72 MMHG | WEIGHT: 154.31 LBS

## 2025-03-06 DIAGNOSIS — F33.0 MILD EPISODE OF RECURRENT MAJOR DEPRESSIVE DISORDER: ICD-10-CM

## 2025-03-06 DIAGNOSIS — F41.1 GAD (GENERALIZED ANXIETY DISORDER): Primary | ICD-10-CM

## 2025-03-06 DIAGNOSIS — G47.00 INSOMNIA, UNSPECIFIED TYPE: ICD-10-CM

## 2025-03-06 PROCEDURE — 1160F RVW MEDS BY RX/DR IN RCRD: CPT | Mod: CPTII,,, | Performed by: STUDENT IN AN ORGANIZED HEALTH CARE EDUCATION/TRAINING PROGRAM

## 2025-03-06 PROCEDURE — 99214 OFFICE O/P EST MOD 30 MIN: CPT | Mod: S$PBB,,, | Performed by: STUDENT IN AN ORGANIZED HEALTH CARE EDUCATION/TRAINING PROGRAM

## 2025-03-06 PROCEDURE — 3074F SYST BP LT 130 MM HG: CPT | Mod: CPTII,,, | Performed by: STUDENT IN AN ORGANIZED HEALTH CARE EDUCATION/TRAINING PROGRAM

## 2025-03-06 PROCEDURE — 3008F BODY MASS INDEX DOCD: CPT | Mod: CPTII,,, | Performed by: STUDENT IN AN ORGANIZED HEALTH CARE EDUCATION/TRAINING PROGRAM

## 2025-03-06 PROCEDURE — 3078F DIAST BP <80 MM HG: CPT | Mod: CPTII,,, | Performed by: STUDENT IN AN ORGANIZED HEALTH CARE EDUCATION/TRAINING PROGRAM

## 2025-03-06 PROCEDURE — 99214 OFFICE O/P EST MOD 30 MIN: CPT | Mod: PBBFAC,PN | Performed by: STUDENT IN AN ORGANIZED HEALTH CARE EDUCATION/TRAINING PROGRAM

## 2025-03-06 PROCEDURE — 1159F MED LIST DOCD IN RCRD: CPT | Mod: CPTII,,, | Performed by: STUDENT IN AN ORGANIZED HEALTH CARE EDUCATION/TRAINING PROGRAM

## 2025-03-06 RX ORDER — ZOLPIDEM TARTRATE 5 MG/1
5 TABLET ORAL NIGHTLY PRN
Qty: 30 TABLET | Refills: 0 | Status: SHIPPED | OUTPATIENT
Start: 2025-03-06 | End: 2025-09-04

## 2025-03-06 RX ORDER — AMITRIPTYLINE HYDROCHLORIDE 10 MG/1
10 TABLET, FILM COATED ORAL NIGHTLY
COMMUNITY
End: 2025-03-06

## 2025-03-06 RX ORDER — VORTIOXETINE 5 MG/1
5 TABLET, FILM COATED ORAL DAILY
Qty: 30 TABLET | Refills: 5 | Status: SHIPPED | OUTPATIENT
Start: 2025-03-06

## 2025-03-06 NOTE — PROGRESS NOTES
"Outpatient Psychiatry Follow-Up Visit    3/6/2025    Clinical Status of Patient:  Outpatient (Ambulatory)    Chief Complaint:  Sheila John is a 19 y.o. female who presents today for follow-up of depression and anxiety. Patient last seen for follow-up on 12/3/2024. Met with patient.      Interval History and Content of Current Session:  Interim Events/Subjective Report/Content of Current Session:   Pt reports doing "ok"  overall.  Continues to have nightmares, more intense than in the past (1x weekly), affecting ability to sleep throughout the night and causing impact on daily functioning.  Reports down mood, endorses currently feeling depressed, fair anxiety, denies interim panic attacks.  Sleep ing 5-6 hrs nightly, 4x nightly awakenings, latency 1-1.5 hrs nightly, tired on awakening. Appetite stable, weight increased (unintentional).  Energy low, motivation fair.  +Anhedonia.  +rare perception of distorted environment (colors changing), feels out of body on occasion as well.  Endorses irritability, denies hopelessness.  Denies SI/HI/AVH/paranoia, denies plan or desire for self harm or harm to others.  Denies SE from current regimen. Reports somatic complaints of occasional CP. Pt voices desire to adjust regimen to address ongoing symptoms.      Psychiatric Review of Systems-is patient experiencing or having changes in  Integrated into HPI above.     Review of Systems   PSYCHIATRIC: Pertinant items are noted in the narrative.  CONSTITUTIONAL: No weight gain or loss.  MUSCULOSKELETAL: Denies myalgias, +back pain occasional.  NEUROLOGIC: No weakness, sensory changes, seizures, confusion, memory loss, tremor or other abnormal movements.  +headaches  CARDIAC: No CP, no palpitations  RESPIRATORY: No shortness of breath.  CARDIOVASCULAR: No tachycardia or chest pain.  GASTROINTESTINAL: No nausea, vomiting, pain, constipation or diarrhea.    Past Medical, Family and Social History: The patient's past medical, family and " "social history have been reviewed and updated as appropriate within the electronic medical record - see encounter notes.    Compliance: unable to obtain trintellix due to insurance issue    Side effects: denies    Risk Parameters:  Patient reports no suicidal ideation  Patient reports no homicidal ideation  Patient reports no self-injurious behavior  Patient reports no violent behavior    Exam (detailed: at least 9 elements; comprehensive: all 15 elements)   Constitutional  Vitals:  Most recent vital signs, dated less than 90 days prior to this appointment, were reviewed.     Vitals:    03/06/25 0936   BP: 111/72   Pulse: 99   Resp: 18   Temp: 98.9 °F (37.2 °C)   TempSrc: Oral   SpO2: 100%   Weight: 70 kg (154 lb 4.8 oz)   Height: 5' 2" (1.575 m)          General:   Constitutional: No acute distress, appears stated age, casually dressed    Neurologic:   Motor: moves all extremities spontaneously and without difficulty, no abnormal involuntary movements observed  Gait: normal gait and station    Mental status examination:   Appearance: appears stated age, casually dressed, no acute distress  Behavior: unremarkable for situation, calm and cooperative  Mood: "ok"  Affect: mood congruent, constricted, and anxious-appearing  Thought process: linear and goal directed  Thought content: no plan or desire for self harm or harm to others, denies paranoia, no delusional ideation volunteered  Perceptions: denies hallucinations or other altered perceptions  Associations: appropriate for conversation  Orientation: oriented to day of week, month, year, location, and situation  Language: English, fluid  Attention: able to attend to interview  Insight: good  Judgement: good    PHQ9:  Over the last two weeks how often have you been bothered by little interest or pleasure in doing things: 0  Over the last two weeks how often have you been bothered by feeling down, depressed or hopeless: 0  PHQ-2 Total Score: 0  PHQ-9 Score: 7  PHQ-9 " Interpretation: Mild        8/14/2024    10:01 AM   SABRINA-7   Was test performed? Yes   1. Feeling nervous, anxious, or on edge? Several days   2. Not being able to stop or control worrying? Several days   3. Worrying too much about different things? Several days   4. Trouble relaxing? Not at all   5. Being so restless that it is hard to sit still? Not at all   6. Becoming easily annoyed or irritable? More than half the days   7. Feeling afraid as if something awful might happen? Not at all   8. If you checked off any problems, how difficult have these problems made it for you to do your work, take care of things at home, or get along with other people? Somewhat difficult   SABRINA-7 Score 5   Number answered (out of first 7) 7   Interpretation Mild Anxiety     Assessment and Diagnosis   Status/Progress: Based on the examination today, the patient's problem(s) is/are adequately but not ideally controlled.  New problems have not been presented today.   Co-morbidities are complicating management of the primary condition.  Number of separate conditions addressed during today's visit: 3 (mood worsening, anxiety fair control, nightmares worsening) .  Medication management: yes: Starting a medication, Stopping a medication, Refilling a prescription, and Deciding to continue a pre-existing prescription. Are referral(s) being ordered today: No.  Complexity (level) of medical decision making employed in the encounter: MODERATE.    General Impression:    ICD-10-CM ICD-9-CM   1. SABRINA (generalized anxiety disorder)  F41.1 300.02   2. Mild episode of recurrent major depressive disorder  F33.0 296.31   3. Insomnia, unspecified type  G47.00 780.52     Intervention/Counseling/Treatment Plan   Encouraged pt to [place] [facility]  Stop amitriptyline due to lack of benefit (pt on separate medication as a migraine preventative already)  Restart trintellix 5mg daily  Start ambien 5mg nightly for sleep onset  insomnia, discussed potential SE including but not limited to sedation, risk of falls, abnormal behaviors in sleep, vivid dreaming, recommended that pt not take medication with other sedating substances including alcohol and OTC products  Continue lamictal XR 100mg daily  Given dissociation + nightmares, suspect trauma history (though pt adamantly denies), also consider medication SE vs migrainous aura (without migraine) as possible etiology  No need for PEC as pt is not an imminent danger to self or others or gravely disabled due to acute psychiatric illness  Discussed that pt should either call clinic for psychiatric crisis symptoms or present to nearest emergency room    Discussed with patient informed consent including diagnosis, risks and benefits of proposed treatment above vs. alternative treatments vs. no treatment, as well as serious and common side effects of these treatments, and the inherent unpredictability of individual responses to these treatments. The patient expresses understanding of the above and displays the capacity to agree with this current plan. Patient also agrees that, currently, the benefits outweigh the risks and would like to pursue treatment at this time, and had no other questions.    Instructions:  Take all medications as prescribed.    Abstain from recreational drugs and alcohol.  Present to ED or call 911 for SI/HI plan or intent, psychosis, or medical emergency.    Return to Clinic: Follow up if symptoms worsen or fail to improve.  Given that provider is leaving in the near future, will send list of psychiatry providers to pt's portal account to assist with continuation of care.     Total time:   The total time for services performed on the date of the encounter (including review of prior visit notes, review of notes from other providers, review of results from laboratory/imaging studies, face-to-face time with patient, and time spent on other activities directly related to  patient care): 32 minutes.    Lucien Castaneda MD  [facility]

## 2025-03-14 ENCOUNTER — PATIENT MESSAGE (OUTPATIENT)
Dept: BEHAVIORAL HEALTH | Facility: CLINIC | Age: 20
End: 2025-03-14
Payer: MEDICAID

## 2025-03-24 ENCOUNTER — TELEPHONE (OUTPATIENT)
Dept: GYNECOLOGY | Facility: CLINIC | Age: 20
End: 2025-03-24
Payer: MEDICAID

## 2025-03-25 ENCOUNTER — TELEPHONE (OUTPATIENT)
Dept: GYNECOLOGY | Facility: CLINIC | Age: 20
End: 2025-03-25
Payer: MEDICAID

## 2025-03-26 ENCOUNTER — OFFICE VISIT (OUTPATIENT)
Dept: GYNECOLOGY | Facility: CLINIC | Age: 20
End: 2025-03-26
Payer: MEDICAID

## 2025-03-26 VITALS
RESPIRATION RATE: 18 BRPM | TEMPERATURE: 98 F | DIASTOLIC BLOOD PRESSURE: 78 MMHG | BODY MASS INDEX: 27.53 KG/M2 | HEART RATE: 90 BPM | HEIGHT: 62 IN | SYSTOLIC BLOOD PRESSURE: 118 MMHG | OXYGEN SATURATION: 98 % | WEIGHT: 149.63 LBS

## 2025-03-26 DIAGNOSIS — Z30.9 ENCOUNTER FOR CONTRACEPTIVE MANAGEMENT, UNSPECIFIED TYPE: Primary | ICD-10-CM

## 2025-03-26 DIAGNOSIS — G43.E09 CHRONIC MIGRAINE WITH AURA WITHOUT STATUS MIGRAINOSUS, NOT INTRACTABLE: ICD-10-CM

## 2025-03-26 DIAGNOSIS — H53.19 VISUAL SNOW SYNDROME: ICD-10-CM

## 2025-03-26 PROCEDURE — 99214 OFFICE O/P EST MOD 30 MIN: CPT | Mod: PBBFAC

## 2025-03-26 NOTE — PROGRESS NOTES
\[facility]\"" OB/GYN CLINIC NOTE  [facility]  [address] [place] [address]  Phone: [phone]  Fax: [phone]    Subjective:     Sheila John is a 19 y.o.  who presents  for referral for contraceptive management     Patient with h/o migraines and visual disturbances  Previous neurologist recommended discontinuing depo provera as it may be worsening her symptoms. However, her new neurologist has no issues with her continuing depo provera  Patient is happy on this contraceptive, periods are controlled. Has history of dysmenorrhea and desires to continue  No GYN complaints today. Notes that she was told her uterus was retroflexed       OBHx:  OB History    Para Term  AB Living   0 0 0 0 0 0   SAB IAB Ectopic Multiple Live Births   0 0 0 0 0       GynHx:    No h/o STI  No paps yet given age  Amenorrheic on depo provera    MedHx:   Past Medical History:   Diagnosis Date    Headache        SurgHx:   Past Surgical History:   Procedure Laterality Date    WISDOM TOOTH EXTRACTION  2024       Medications:   Current Outpatient Medications   Medication Instructions    DEPO-PROVERA 150 mg, Every 3 months    lamotrigine XR (LAMICTAL XR) 100 mg TR24 XR tablet TAKE 1 TABLET(100 MG) BY MOUTH DAILY    metoprolol succinate (TOPROL-XL) 25 mg    QULIPTA 60 mg, Oral    TRINTELLIX 5 mg, Oral, Daily    ubrogepant (UBRELVY) 100 mg, Oral, 2 times daily PRN    zolpidem (AMBIEN) 5 mg, Oral, Nightly PRN       FM Hx:   Family History   Problem Relation Name Age of Onset    Seizures Maternal Uncle      Liver disease Maternal Uncle      Migraines Maternal Grandmother      Multiple sclerosis Maternal Grandmother      Hypertension Maternal Grandfather      Hypertension Paternal Grandmother      Diabetes Paternal Grandmother        Denies hx of ovarian, uterine, endometrial, or colon cancer.    Social Hx:   Social History[1]   Denies tobacco, alcohol and illicit drug usage.    Review of Systems  Denies fevers, chills,  "headache, blurry vision, nausea, vomiting, dizziness, or syncope.Denies chest pain, shortness of breath, RUQ pain, or calf pain.    Objective:     Vitals:    25 0922   BP: 118/78   BP Location: Left arm   Patient Position: Sitting   Pulse: 90   Resp: 18   Temp: 98.4 °F (36.9 °C)   TempSrc: Oral   SpO2: 98%   Weight: 67.9 kg (149 lb 9.6 oz)   Height: 5' 2" (1.575 m)     Body mass index is 27.36 kg/m².    Physical Exam:     General: alert and oriented, in no acute distress  Lungs: clear to auscultation bilaterally, no conversational dyspnea  Heart: RRR  Abdomen: Soft, non-distended, non tender to palpation, no involuntary guarding, no rebound tenderness normoactive bowel sounds  Extremities: Normal, atraumatic, non-edematous, non-tender, bilaterally     Pelvic deferred today     Labs  No results found for this or any previous visit (from the past 24 hours).    Imaging  TECHNIQUE: Sonographic imaging of the pelvis was performed with a curved array transducer.     FINDINGS: The uterus demonstrates normal echogenicity measuring 6.8 x 5.5 x 3.8 cm. The endometrium shows normal increased echogenicity measuring 0.7 cm. There is no fluid in the pelvic cul-de-sac.     Appropriate bilateral ovarian blood flow is demonstrated. The right ovary measures 3.7 x 2.7 x 1.9 cm. The left ovary measures 2.0 x 1.7 x 1.7 cm.       Assessment:   19 y.o.  here for contraceptive management with plan to continue depo provera*    Plan:     Problem List Items Addressed This Visit       Chronic migraine with aura without status migrainosus, not intractable    Visual snow syndrome    Contraceptive management - Primary    - Neurology ok with continuation of depo provera and patient happy with results (resolution of dysmenorrhea). Will continue  - Discussed bone density correlation with long term depo provera use. Patient has been on on ly for a year at this time  - Patient nearly 20. Deferred pelvic today in lieu of pelvic at time of " first pap smear which we will schedule   - No GYN complaints today, defer STI screening until pelvic exam.           FU WWE NP      Patient and plan were discussed with Dr. Rouse.    Stephanie Vaughn MD   [facility], PGY-2             [1]   Social History  Tobacco Use    Smoking status: Never    Smokeless tobacco: Never   Substance Use Topics    Alcohol use: Never    Drug use: Never

## 2025-03-26 NOTE — ASSESSMENT & PLAN NOTE
- Neurology ok with continuation of depo provera and patient happy with results (resolution of dysmenorrhea). Will continue  - Discussed bone density correlation with long term depo provera use. Patient has been on on ly for a year at this time  - Patient nearly 20. Deferred pelvic today in lieu of pelvic at time of first pap smear which we will schedule   - No GYN complaints today, defer STI screening until pelvic exam.